# Patient Record
Sex: MALE | Race: WHITE | URBAN - METROPOLITAN AREA
[De-identification: names, ages, dates, MRNs, and addresses within clinical notes are randomized per-mention and may not be internally consistent; named-entity substitution may affect disease eponyms.]

---

## 2017-01-01 ENCOUNTER — NURSING HOME VISIT (OUTPATIENT)
Dept: GERIATRICS | Facility: CLINIC | Age: 77
End: 2017-01-01
Payer: MEDICAID

## 2017-01-01 ENCOUNTER — MEDICAL CORRESPONDENCE (OUTPATIENT)
Dept: HEALTH INFORMATION MANAGEMENT | Facility: CLINIC | Age: 77
End: 2017-01-01

## 2017-01-01 VITALS
TEMPERATURE: 97.3 F | OXYGEN SATURATION: 99 % | HEART RATE: 85 BPM | RESPIRATION RATE: 20 BRPM | SYSTOLIC BLOOD PRESSURE: 115 MMHG | DIASTOLIC BLOOD PRESSURE: 65 MMHG | WEIGHT: 108.3 LBS

## 2017-01-01 VITALS
DIASTOLIC BLOOD PRESSURE: 77 MMHG | TEMPERATURE: 96.5 F | RESPIRATION RATE: 20 BRPM | SYSTOLIC BLOOD PRESSURE: 132 MMHG | OXYGEN SATURATION: 97 % | WEIGHT: 105 LBS | HEART RATE: 80 BPM

## 2017-01-01 DIAGNOSIS — F41.9 ANXIETY: ICD-10-CM

## 2017-01-01 DIAGNOSIS — J96.11 CHRONIC RESPIRATORY FAILURE WITH HYPOXIA (H): ICD-10-CM

## 2017-01-01 DIAGNOSIS — J44.9 CHRONIC OBSTRUCTIVE PULMONARY DISEASE, UNSPECIFIED COPD TYPE (H): Primary | ICD-10-CM

## 2017-01-01 DIAGNOSIS — I10 BENIGN ESSENTIAL HYPERTENSION: ICD-10-CM

## 2017-01-01 PROCEDURE — 99308 SBSQ NF CARE LOW MDM 20: CPT | Performed by: NURSE PRACTITIONER

## 2017-01-01 PROCEDURE — 99309 SBSQ NF CARE MODERATE MDM 30: CPT | Performed by: INTERNAL MEDICINE

## 2017-05-20 ENCOUNTER — TRANSFERRED RECORDS (OUTPATIENT)
Dept: HEALTH INFORMATION MANAGEMENT | Facility: CLINIC | Age: 77
End: 2017-05-20

## 2017-06-07 ENCOUNTER — NURSING HOME VISIT (OUTPATIENT)
Dept: GERIATRICS | Facility: CLINIC | Age: 77
End: 2017-06-07
Payer: MEDICARE

## 2017-06-07 VITALS
WEIGHT: 116 LBS | HEART RATE: 74 BPM | DIASTOLIC BLOOD PRESSURE: 92 MMHG | TEMPERATURE: 97.7 F | RESPIRATION RATE: 20 BRPM | SYSTOLIC BLOOD PRESSURE: 161 MMHG | OXYGEN SATURATION: 95 %

## 2017-06-07 DIAGNOSIS — F32.A DEPRESSION, UNSPECIFIED DEPRESSION TYPE: ICD-10-CM

## 2017-06-07 DIAGNOSIS — I10 ESSENTIAL HYPERTENSION: ICD-10-CM

## 2017-06-07 DIAGNOSIS — J44.1 COPD EXACERBATION (H): Primary | ICD-10-CM

## 2017-06-07 DIAGNOSIS — R53.81 PHYSICAL DECONDITIONING: ICD-10-CM

## 2017-06-07 DIAGNOSIS — F41.9 ANXIETY: ICD-10-CM

## 2017-06-07 PROCEDURE — 99207 ZZC CDG-CORRECTLY CODED, REVIEWED AND AGREE: CPT | Performed by: NURSE PRACTITIONER

## 2017-06-07 PROCEDURE — 99309 SBSQ NF CARE MODERATE MDM 30: CPT | Performed by: NURSE PRACTITIONER

## 2017-06-07 RX ORDER — TIOTROPIUM BROMIDE 18 UG/1
18 CAPSULE ORAL; RESPIRATORY (INHALATION) DAILY
COMMUNITY

## 2017-06-07 RX ORDER — ALBUTEROL SULFATE 0.83 MG/ML
1 SOLUTION RESPIRATORY (INHALATION) PRN
COMMUNITY
End: 2018-01-01

## 2017-06-07 RX ORDER — BUDESONIDE AND FORMOTEROL FUMARATE DIHYDRATE 160; 4.5 UG/1; UG/1
2 AEROSOL RESPIRATORY (INHALATION) 2 TIMES DAILY
COMMUNITY
End: 2018-01-01

## 2017-06-07 RX ORDER — ZINC OXIDE 216 MG/ML
LOTION TOPICAL DAILY
COMMUNITY
End: 2018-01-01

## 2017-06-07 RX ORDER — ALBUTEROL SULFATE 90 UG/1
2 AEROSOL, METERED RESPIRATORY (INHALATION) 4 TIMES DAILY PRN
COMMUNITY

## 2017-06-07 RX ORDER — CETIRIZINE HYDROCHLORIDE 10 MG/1
10 TABLET ORAL DAILY
COMMUNITY
End: 2018-01-01

## 2017-06-07 NOTE — PROGRESS NOTES
[facility]  PRIMARY CARE PROVIDER AND CLINIC:  Doctor, None   Chief Complaint   Patient presents with     Hospital F/U       HPI:    Brando Tatum is a 76 year old  (1940),admitted to the [facility] from [facility].  Hospital stay 5/20/17 through 5/24/17.  Admitted to this facility for  rehab, medical management and nursing care.    Hospital Course Per [facility] Discharge Summary 5/24/17:              Current issues are:      COPD exacerbation (H)  See above. Patient has been using oxygen just as needed. He has episodes of SOB but he and nursing feel that this is more anxiety related. He is generally 95% on room air. No cough, wheezing.      Essential hypertension  BP readings range:  161/92  133/62  138/82  162/89  149/82  138/82  145/83    Physical deconditioning  Working with therapy.     Anxiety  Depression, unspecified depression type  Current regimen Lorazepam 0.25mg po BID, Lexapro 10mg po qday, and prn Seroquel. He has had a few panic attacks since arriving here. He has used the seroquel. He says this is new for him.      CODE STATUS/ADVANCE DIRECTIVES DISCUSSION:   DNR / DNI  Patient's living condition: lives alone    ALLERGIES:Review of patient's allergies indicates not on file.  PAST MEDICAL HISTORY:  has no past medical history on file.  PAST SURGICAL HISTORY:  has no past surgical history on file.  FAMILY HISTORY: family history is not on file.  SOCIAL HISTORY:      Post Discharge Medication Reconciliation Status: discharge medications reconciled, continue medications without change.  Current Outpatient Prescriptions   Medication Sig Dispense Refill     albuterol (2.5 MG/3ML) 0.083% neb solution Take 1 vial by nebulization every 2 hours as needed for shortness of breath / dyspnea or wheezing       albuterol (PROAIR HFA/PROVENTIL HFA/VENTOLIN HFA) 108 (90 BASE) MCG/ACT Inhaler Inhale 2 puffs into the lungs as needed for shortness of breath /  dyspnea or wheezing       LISINOPRIL PO Take 10 mg by mouth daily       cetirizine (ZYRTEC) 10 MG tablet Take 10 mg by mouth daily       DOCUSATE SODIUM PO Take 100 mg by mouth 2 times daily as needed for constipation       TRAMADOL HCL PO Take 50 mg by mouth every 6 hours as needed for moderate to severe pain       NUTRITIONAL SUPPLEMENT LIQD Take by mouth daily 240cc       LORazepam (ATIVAN PO) Take 0.25 mg by mouth 2 times daily       tiotropium (SPIRIVA) 18 MCG capsule Inhale 18 mcg into the lungs daily       QUETIAPINE FUMARATE PO Take 12.5 mg by mouth daily as needed       Escitalopram Oxalate (LEXAPRO PO) Take 10 mg by mouth daily       budesonide-formoterol (SYMBICORT) 160-4.5 MCG/ACT Inhaler Inhale 2 puffs into the lungs 2 times daily         ROS:  10 point ROS of systems including Constitutional, Eyes, Respiratory, Cardiovascular, Gastroenterology, Genitourinary, Integumentary, Muscularskeletal, Psychiatric were all negative except for pertinent positives noted in my HPI.    Exam:  BP (!) 161/92  Pulse 74  Temp 97.7  F (36.5  C)  Resp 20  Wt 116 lb (52.6 kg)  SpO2 95%  GENERAL APPEARANCE:  Alert, in no distress, oriented  ENT:  Mouth and posterior oropharynx normal, moist mucous membranes, normal hearing acuity  EYES:  EOM, conjunctivae, lids, pupils and irises normal  NECK:  No adenopathy,masses or thyromegaly  RESP:  respiratory effort and palpation of chest normal, lungs clear to auscultation , no respiratory distress  CV:  Palpation and auscultation of heart done , regular rate and rhythm, no murmur, rub, or gallop, no edema  ABDOMEN:  normal bowel sounds, soft, nontender, no hepatosplenomegaly or other masses  SKIN:  Inspection of skin and subcutaneous tissue baseline, Palpation of skin and subcutaneous tissue baseline  PSYCH:  oriented X 3, normal insight, judgement and memory, affect and mood normal    Lab/Diagnostic data: None available at this time.       ASSESSMENT/PLAN:  (J44.1) COPD  exacerbation (H)  (primary encounter diagnosis)  Comment: Improving  Plan: Continue current POC with no changes at this time. Monitor for respiratory distress. Monitor SaO2    (I10) Essential hypertension  Comment: Fair control. To avoid risk of hypotension, falls, dizziness and tissue hypoperfusion, recommend  BP goal is < 150/90mmHg.  Plan: Continue current POC with no changes at this time and adjustments as needed.    (R53.81) Physical deconditioning  Comment: Due to acute illness  Plan: PT/OT eval and treat, discharge planning per their recommendations.    (F41.9) Anxiety  (F32.9) Depression, unspecified depression type  Comment: Chronic with some acute worsening. Expect that these panic attacks will improve with time. They appear to mild and short-lived  Plan: Continue current POC with no changes at this time and adjustments as needed.        Information reviewed:  Medications, vital signs, orders, nursing notes, problem list, hospital information.     Electronically signed by:  JUVE Strange CNP   [facility]  Pager: [phone]

## 2017-06-21 ENCOUNTER — NURSING HOME VISIT (OUTPATIENT)
Dept: GERIATRICS | Facility: CLINIC | Age: 77
End: 2017-06-21
Payer: MEDICAID

## 2017-06-21 VITALS
WEIGHT: 112 LBS | TEMPERATURE: 98.2 F | SYSTOLIC BLOOD PRESSURE: 127 MMHG | HEART RATE: 89 BPM | RESPIRATION RATE: 18 BRPM | OXYGEN SATURATION: 100 % | DIASTOLIC BLOOD PRESSURE: 79 MMHG

## 2017-06-21 DIAGNOSIS — I10 BENIGN ESSENTIAL HYPERTENSION: ICD-10-CM

## 2017-06-21 DIAGNOSIS — K59.01 SLOW TRANSIT CONSTIPATION: ICD-10-CM

## 2017-06-21 DIAGNOSIS — J96.11 CHRONIC RESPIRATORY FAILURE WITH HYPOXIA (H): ICD-10-CM

## 2017-06-21 DIAGNOSIS — F41.9 ANXIETY: ICD-10-CM

## 2017-06-21 DIAGNOSIS — R53.81 PHYSICAL DECONDITIONING: ICD-10-CM

## 2017-06-21 DIAGNOSIS — J44.9 CHRONIC OBSTRUCTIVE PULMONARY DISEASE, UNSPECIFIED COPD TYPE (H): Primary | ICD-10-CM

## 2017-06-21 PROCEDURE — 99207 ZZC CDG-CORRECTLY CODED, REVIEWED AND AGREE: CPT | Performed by: INTERNAL MEDICINE

## 2017-06-21 PROCEDURE — 99306 1ST NF CARE HIGH MDM 50: CPT | Performed by: INTERNAL MEDICINE

## 2017-06-21 NOTE — PROGRESS NOTES
[facility]  INITIAL VISIT NOTE  June 21, 2017    PRIMARY CARE PROVIDER AND CLINIC:  Doctor, None     Chief Complaint   Patient presents with     Hospital F/U       HPI:    Brando Tatum is a 76 year old  (1940) male who was seen at [facility] on June 21, 2017 for an initial visit. Medical history is notable for COPD with chronic hypoxic respiratory failure (2L), HTN and anxiety. He was hospitalized at the [facility] from 5/20/17 to 5/24/17 where he presented with increasing shortness of breath. He was treated with a prednisone burst. PTA lisinopril was increased. He was admitted to this facility for medical management and rehab.     Today, Mr. Tatum is seen in his room. Anxiety is apparent. Supplemental O2 at baseline 2L. Says dyspnea is at baseline and wants to know how to make it better. Discussed nature of COPD. No chest pain. Working with therapies.     CODE STATUS:   DNR / DNI    ALLERGIES:   No Known Allergies    PAST MEDICAL HISTORY:   COPD  Chronic Hypoxic Respiratory Failure  Anxiety    PAST SURGICAL HISTORY:   No past surgical history on file.    FAMILY HISTORY:   No family history on file.    SOCIAL HISTORY:   Lives alone    MEDICATIONS:  Current Outpatient Prescriptions   Medication Sig Dispense Refill     albuterol (2.5 MG/3ML) 0.083% neb solution Take 1 vial by nebulization every 2 hours as needed for shortness of breath / dyspnea or wheezing       albuterol (PROAIR HFA/PROVENTIL HFA/VENTOLIN HFA) 108 (90 BASE) MCG/ACT Inhaler Inhale 2 puffs into the lungs as needed for shortness of breath / dyspnea or wheezing       LISINOPRIL PO Take 10 mg by mouth daily       cetirizine (ZYRTEC) 10 MG tablet Take 10 mg by mouth daily       DOCUSATE SODIUM PO Take 100 mg by mouth 2 times daily as needed for constipation       TRAMADOL HCL PO Take 50 mg by mouth every 6 hours as needed for moderate to severe pain       NUTRITIONAL SUPPLEMENT LIQD Take by mouth daily 240cc        LORazepam (ATIVAN PO) Take 0.25 mg by mouth 2 times daily       tiotropium (SPIRIVA) 18 MCG capsule Inhale 18 mcg into the lungs daily       QUETIAPINE FUMARATE PO Take 12.5 mg by mouth daily as needed       Escitalopram Oxalate (LEXAPRO PO) Take 10 mg by mouth daily       budesonide-formoterol (SYMBICORT) 160-4.5 MCG/ACT Inhaler Inhale 2 puffs into the lungs 2 times daily         Post Discharge Medication Reconciliation Status: medication reconcilation previously completed during another office visit.    ROS:  10 point ROS neg other than the symptoms noted above in the HPI.    PHYSICAL EXAM:  /79  Pulse 89  Temp 98.2  F (36.8  C)  Resp 18  Wt 112 lb (50.8 kg)  SpO2 100%  Gen: sitting on edge of bed, alert, cooperative and in no acute distress  HEENT: normocephalic; nasal cannula in place  Card: RRR, S1, S2, no murmurs  Resp: lungs clear to auscultation bilaterally, no wheezes and overall moving good air  GI: abdomen soft, not-tender  MSK: normal muscle tone, no LE edema  Neuro: CX II-XII grossly in tact; ROM in all four extremities grossly in tact  Psych: alert and oriented x3; anxious affect    LABORATORY/IMAGING DATA:  Reviewed as per Epic    ASSESSMENT/PLAN:    COPD  Chronic Hypoxic Respiratory Failure  No signs of exacerbation. Moving good air on exam, no wheezing.   -- continues on supplemental O2  -- continues on budesonide-formoterol 160-4.5 BID, tiotropium 18 mcg daily and albuterol PRN    Anxiety  Evident today. Reports panic attacks at home.   -- continues on escitalopram 10 mg daily, lorazepam 0.25 mg BID and quetiapine 12.5 mg daily PRN    HTN  Lisinopril increased during hospitalization. SBPs controlled.   -- continues on lisinopril 10 mg daily  -- follow BPs and adjust medication as needed    Slow Transit Constipation  -- continues on docusate 100 mg BID PRN  -- adjust bowel regimen as needed    Physical Deconditioning  In setting of hospitalization and underlying medical conditions  --  ongoing PT/OT      Electronically signed by:  Evelyn Lopez MD

## 2017-06-24 PROBLEM — R53.81 PHYSICAL DECONDITIONING: Status: ACTIVE | Noted: 2017-06-24

## 2017-06-24 PROBLEM — K59.01 SLOW TRANSIT CONSTIPATION: Status: ACTIVE | Noted: 2017-06-24

## 2017-06-24 PROBLEM — J96.11 CHRONIC RESPIRATORY FAILURE WITH HYPOXIA (H): Status: ACTIVE | Noted: 2017-06-24

## 2017-06-24 PROBLEM — J44.9 CHRONIC OBSTRUCTIVE PULMONARY DISEASE, UNSPECIFIED COPD TYPE (H): Status: ACTIVE | Noted: 2017-06-24

## 2017-06-24 PROBLEM — I10 BENIGN ESSENTIAL HYPERTENSION: Status: ACTIVE | Noted: 2017-06-24

## 2017-06-24 PROBLEM — F41.9 ANXIETY: Status: ACTIVE | Noted: 2017-06-24

## 2017-06-28 ENCOUNTER — NURSING HOME VISIT (OUTPATIENT)
Dept: GERIATRICS | Facility: CLINIC | Age: 77
End: 2017-06-28
Payer: MEDICAID

## 2017-06-28 VITALS
DIASTOLIC BLOOD PRESSURE: 67 MMHG | HEART RATE: 87 BPM | RESPIRATION RATE: 18 BRPM | SYSTOLIC BLOOD PRESSURE: 109 MMHG | OXYGEN SATURATION: 94 % | TEMPERATURE: 96.4 F

## 2017-06-28 DIAGNOSIS — J44.9 CHRONIC OBSTRUCTIVE PULMONARY DISEASE, UNSPECIFIED COPD TYPE (H): Primary | ICD-10-CM

## 2017-06-28 DIAGNOSIS — R53.81 PHYSICAL DECONDITIONING: ICD-10-CM

## 2017-06-28 DIAGNOSIS — F41.9 ANXIETY: ICD-10-CM

## 2017-06-28 DIAGNOSIS — I10 BENIGN ESSENTIAL HYPERTENSION: ICD-10-CM

## 2017-06-28 PROCEDURE — 99309 SBSQ NF CARE MODERATE MDM 30: CPT | Performed by: NURSE PRACTITIONER

## 2017-06-28 NOTE — PROGRESS NOTES
[facility]    Chief Complaint   Patient presents with     RECHECK       HPI:    Brando Tatum is a 76 year old  (1940), who is being seen today for an episodic care visit at [facility].  HPI information obtained from: facility chart records and patient report. He was hospitalized at the [facility] from 5/20/17 to 5/24/17 where he presented with increasing shortness of breath. He was treated with a prednisone burst. PTA lisinopril was increased. He was admitted to this facility for medical management and rehab.     Today's concern is:  Chronic obstructive pulmonary disease, unspecified COPD type (H)  Pt reports having dsypnea with activity, but is starting to get used to the work of breathing. Supplemental O2 at baseline 2. Pt denies chest pain/pressure, no coughing/wheezing.    Physical deconditioning  Pt currently not being seen with therapy. Pt reports concerns about losing weight and that the food does not have enough calories. Pt's weights have decreased from 115 lbs to 110 lbs during stay. Pt reports that placement is being worked on for an apartment, hopeful for the end of this month.     Anxiety  Current regimen lorazepam 0.25 mg PO BID, lexapro 10 mg PO daily, and prn seroquel. Pt reports still having almost daily small panic attacks, related to a sense of claustrophobia. Pt reports not sleeping well with noise and thinks that affects his anxiety as well.     Benign essential hypertension  Pt on lisinopril. Pt denies HA, chest pain/pressure  BP readings:  109/67  126/68  130/78  121/68  127/79      ALLERGIES: Review of patient's allergies indicates no known allergies.  Past Medical, Surgical, Family and Social History reviewed and updated in [facility].    Current Outpatient Prescriptions   Medication Sig Dispense Refill     albuterol (2.5 MG/3ML) 0.083% neb solution Take 1 vial by nebulization every 2 hours as needed for shortness of breath / dyspnea or wheezing       albuterol  (PROAIR HFA/PROVENTIL HFA/VENTOLIN HFA) 108 (90 BASE) MCG/ACT Inhaler Inhale 2 puffs into the lungs as needed for shortness of breath / dyspnea or wheezing       LISINOPRIL PO Take 10 mg by mouth daily       cetirizine (ZYRTEC) 10 MG tablet Take 10 mg by mouth daily       DOCUSATE SODIUM PO Take 100 mg by mouth 2 times daily as needed for constipation       TRAMADOL HCL PO Take 50 mg by mouth every 6 hours as needed for moderate to severe pain       NUTRITIONAL SUPPLEMENT LIQD Take by mouth daily 240cc       LORazepam (ATIVAN PO) Take 0.25 mg by mouth 2 times daily       tiotropium (SPIRIVA) 18 MCG capsule Inhale 18 mcg into the lungs daily       QUETIAPINE FUMARATE PO Take 12.5 mg by mouth daily as needed       Escitalopram Oxalate (LEXAPRO PO) Take 10 mg by mouth daily       budesonide-formoterol (SYMBICORT) 160-4.5 MCG/ACT Inhaler Inhale 2 puffs into the lungs 2 times daily       Medications reviewed:  Medications reconciled to facility chart and changes were made to reflect current medications as identified as above med list.     REVIEW OF SYSTEMS:  10 point ROS of systems including Constitutional, Eyes, Respiratory, Cardiovascular, Gastroenterology, Genitourinary, Integumentary, Muscularskeletal, Psychiatric were all negative except for pertinent positives noted in my HPI.    Physical Exam:  /67  Pulse 87  Temp 96.4  F (35.8  C)  Resp 18  SpO2 94%  GENERAL APPEARANCE:  Alert, in no distress, thin, cooperative  ENT:  Mouth and posterior oropharynx normal, moist mucous membranes  EYES:  EOM, conjunctivae, lids, pupils and irises normal  NECK:  No adenopathy,masses or thyromegaly  RESP:  respiratory effort and palpation of chest normal, lungs clear to auscultation , no respiratory distress  CV:  Palpation and auscultation of heart done , regular rate and rhythm, no murmur, rub, or gallop, no edema, +2 pedal pulses  ABDOMEN:  normal bowel sounds, soft, nontender, no hepatosplenomegaly or other  masses  PSYCH:  oriented X 3, normal insight, judgement and memory, affect and mood normal    Recent Labs:    None        Assessment/Plan:  (J44.9) Chronic obstructive pulmonary disease, unspecified COPD type (H)  (primary encounter diagnosis)  Comment: Chronic, improving.  Plan: Continue current POC with no changes at this time. Monitor for respiratory distress, SaO2.    (R53.81) Physical deconditioning  Comment: Due to acute illness, improved. Will chronically be limited by JENSEN.  Plan: Dietician to eval for weight loss     (F41.9) Anxiety  Comment: Chronic with some acute worsening. Not evident during visit. Expect that panic attacks will improve with time, more sleep and when in own apartment. Panic attacks are mild and short-lived, not needing extra medication to control.   Plan: Continue with current POC. Adjust evening lorazepam dose to 9 pm to assist with sleeping.     (I10) Benign essential hypertension  Comment: Chronic, Controlled. To avoid risk of hypotension, falls, dizziness and tissue hypoperfusion, recommend BP goal is < 150/90.  Plan: Continue with current POC with no changes at this time and adjust as needed.     Orders:  Dietary Referral r/t weight lose  Changes lorezapam evening dose to 9pm        Electronically signed by  Sharmila Mccord RN [employer] student NP    Patient seen and examined along with the nurse practitioner student. The HPI and ROS were obtained by the student and confirmed by myself. All objective, assessments, and plans were completed by myself  Aylin AN, CNP

## 2017-07-27 ENCOUNTER — TELEPHONE (OUTPATIENT)
Dept: GERIATRICS | Facility: CLINIC | Age: 77
End: 2017-07-27

## 2017-07-27 NOTE — TELEPHONE ENCOUNTER
TELEPHONE ENCOUNTER:      Brando Tatum is a 76 year old  (1940),Nurse called today to report: weaker today than yesterday, looking pale, working harder to breath.  128/80 P 104 R 22 O2 95% on 2L/NC    ASSESSMENT/PLAN  COPD EXACERBATION    HOLD SYMBICORT X 5 DAYS    DUONEB 1 VIAL QID X 5 DAYS    PREDNISONE 20 MG X 3 DAYS THEN 10 MG X 3 DAYS THEN DC    Olga Pierce, APRN CNP

## 2017-07-31 ENCOUNTER — DOCUMENTATION ONLY (OUTPATIENT)
Dept: OTHER | Facility: CLINIC | Age: 77
End: 2017-07-31

## 2017-07-31 PROBLEM — Z71.89 ACP (ADVANCE CARE PLANNING): Chronic | Status: ACTIVE | Noted: 2017-07-31

## 2017-08-10 NOTE — PROGRESS NOTES
[facility]  Nursing Home Regulatory Visit  August 10, 2017    Chief Complaint   Patient presents with     FPC Regulatory       HPI:    Brando Tatum is a 77 year old  (1940), who is being seen today for a federally mandated E/M visit at [facility]. Today's concerns are:    1) HTN -- SBPs 130s overall on lisinopril 10 mg daily   2) COPD / Chronic Hypoxic Respiratory Failure -- No signs of exacerbation. His anxiety compounds any difficulty breathing.  Managed with budesonide-formoterol 160-4.5 BID, tiotropium 18 mcg daily and albuterol PRN  3) Anxiety -- remains a significant issue, but stable with current regimen of escitalopram 10 mg daily, lorazepam 0.25 mg BID and quetiapine 12.5 mg daily PRN    ALLERGIES: Review of patient's allergies indicates no known allergies.    PAST MEDICAL HISTORY:   COPD  Chronic Hypoxic Respiratory Failure  Anxiety  HTN    PAST SURGICAL HISTORY:   No past surgical history on file.    FAMILY HISTORY:   No family history on file.    SOCIAL HISTORY:   Lives in a SNF    MEDICATIONS:  Current Outpatient Prescriptions   Medication Sig Dispense Refill     albuterol (2.5 MG/3ML) 0.083% neb solution Take 1 vial by nebulization every 2 hours as needed for shortness of breath / dyspnea or wheezing       albuterol (PROAIR HFA/PROVENTIL HFA/VENTOLIN HFA) 108 (90 BASE) MCG/ACT Inhaler Inhale 2 puffs into the lungs as needed for shortness of breath / dyspnea or wheezing       LISINOPRIL PO Take 10 mg by mouth daily       cetirizine (ZYRTEC) 10 MG tablet Take 10 mg by mouth daily       DOCUSATE SODIUM PO Take 100 mg by mouth 2 times daily as needed for constipation       TRAMADOL HCL PO Take 50 mg by mouth every 6 hours as needed for moderate to severe pain       NUTRITIONAL SUPPLEMENT LIQD Take by mouth daily 240cc       LORazepam (ATIVAN PO) Take 0.25 mg by mouth 2 times daily       tiotropium (SPIRIVA) 18 MCG capsule Inhale 18 mcg into the lungs daily        QUETIAPINE FUMARATE PO Take 12.5 mg by mouth daily as needed       Escitalopram Oxalate (LEXAPRO PO) Take 10 mg by mouth daily       budesonide-formoterol (SYMBICORT) 160-4.5 MCG/ACT Inhaler Inhale 2 puffs into the lungs 2 times daily         Medications reviewed:  Medications reconciled to facility chart and changes were made to reflect current medications as identified as above med list. Below are the changes that were made:   Medications stopped since last EPIC medication reconciliation:   There are no discontinued medications.  Medications started since last [facility] medication reconciliation:  No orders of the defined types were placed in this encounter.    Case Management:  I have reviewed the care plan and MDS and do agree with the plan.   Information reviewed:  Medications, vital signs, orders, and nursing notes.    ROS:  4 point ROS neg other than the symptoms noted above in the HPI.    PHYSICAL EXAM:  /77  Pulse 80  Temp 96.5  F (35.8  C)  Resp 20  Wt 105 lb (47.6 kg)  SpO2 97%  Gen: sitting on edge of bed, alert, cooperative and in no acute distress  HEENT: normocephalic; nasal cannula in place  Card: RRR, S1, S2, no murmurs  Resp: lungs diminished but clear to auscultation   GI: abdomen soft, not-tender  Ext: no LE edema  Neuro: CX II-XII grossly in tact; ROM in all four extremities grossly in tact  Psych: alert and oriented to self and general situation; normal affect today     Lab/Diagnostic data:  Reviewed as per Epic    ASSESSMENT/PLAN    1) HTN   SBPs 130s overall   -- continues on lisinopril 10 mg daily   -- follow BPs and adjust medication as needed    2) COPD / Chronic Hypoxic Respiratory Failure    No signs of exacerbation. His anxiety compounds any difficulty breathing.    -- continues on budesonide-formoterol 160-4.5 BID, tiotropium 18 mcg daily and albuterol PRN    3) Anxiety   Remains a significant issue, but stable with current regimen  -- continues on escitalopram 10 mg daily,  lorazepam 0.25 mg BID and quetiapine 12.5 mg daily PRN  -- supportive cares    Brando Tatum is stable. No concerns per nursing. No changes to plan of care today.    Electronically signed by:  Evelny Lopez MD

## 2017-08-16 NOTE — PROGRESS NOTES
Face to Face and Medical Necessity Statement for DME Provider visit    Demographic Information on Brando Tatum:  Gender: male  : 1940  [address]  [facility] [address]  [phone] (home) [phone] (work)    Medical Record: [id number]  Social Security Number: [id number]  Primary Care Provider: Doctor, None  Insurance: Pay[place] / [place] / Product Type: Medicaid /     HPI:   Brando Tatum is a 77 year old  (1940), who is being seen today for a face to face provider visit at [facility]; medical necessity statement for DME included. This patient requires the following:  DME ordered:  Oxygen: 2 L/min via nasal cannula neither continuous and needs portable tank due to mobility in home environment ; Clinical Documentation: (Obtain documented RA sat with in 2 days of discharge in acute setting or within 30 days of provider visit):  Method 2: During Ambulation/Exercises: Qualifing saturation level is < 88% or below during amulation: 94% sat on RA at rest on 17, 87% sat during ambulation/exercises without Oxygen, and 95% sat during amulation/exercises with Oxygen at 2 Lpm on 17 date      Nebulizer with kits:  for albuterol 2.5mg/3mL q2h prn     Medical Necessity Statement   Pt needing above DME with expected length of need of  99  months  due to medical necessity associated with following diagnosis:  Chronic obstructive pulmonary disease, unspecified COPD type (H)      Patient will require oxygen and nebulizer for use at home after discharge due to COPD. Without this equipment, patient is high risk for hospitalization.     [facility]   has no past medical history on file.  CURRENT MEDICATIONS  Current Outpatient Prescriptions   Medication Sig Dispense Refill     albuterol (2.5 MG/3ML) 0.083% neb solution Take 1 vial by nebulization every 2 hours as needed for shortness of breath / dyspnea or wheezing       albuterol (PROAIR HFA/PROVENTIL HFA/VENTOLIN  HFA) 108 (90 BASE) MCG/ACT Inhaler Inhale 2 puffs into the lungs as needed for shortness of breath / dyspnea or wheezing       LISINOPRIL PO Take 10 mg by mouth daily       cetirizine (ZYRTEC) 10 MG tablet Take 10 mg by mouth daily       DOCUSATE SODIUM PO Take 100 mg by mouth 2 times daily as needed for constipation       TRAMADOL HCL PO Take 50 mg by mouth every 6 hours as needed for moderate to severe pain       NUTRITIONAL SUPPLEMENT LIQD Take by mouth daily 240cc       LORazepam (ATIVAN PO) Take 0.25 mg by mouth 2 times daily       tiotropium (SPIRIVA) 18 MCG capsule Inhale 18 mcg into the lungs daily       QUETIAPINE FUMARATE PO Take 12.5 mg by mouth 2 times daily as needed        Escitalopram Oxalate (LEXAPRO PO) Take 10 mg by mouth daily       budesonide-formoterol (SYMBICORT) 160-4.5 MCG/ACT Inhaler Inhale 2 puffs into the lungs 2 times daily       ROS:4 point ROS including Respiratory, CV, GI and , other than that noted in the HPI,  is negative     EXAM  Vitals: /65  Pulse 85  Temp 97.3  F (36.3  C)  Resp 20  Wt 108 lb 4.8 oz (49.1 kg)  SpO2 99%;BMI= There is no height or weight on file to calculate BMI.  Respiratory: negative, Percussion normal. Good diaphragmatic excursion. Lungs clear     ASSESSMENT/PLAN:  1. Chronic obstructive pulmonary disease, unspecified COPD type (H)        Orders:  Oxygen 2 liters continuous  Neb machine with kits  Facility staff/TC to contact [employer] to get their order form for provider to fill out    ELECTRONICALLY SIGNED BY PROVIDER:  JUVE Strange CNP   NPI: [id number]  [facility]  [address] [place] [address], [address]  [place] [address]

## 2018-01-01 ENCOUNTER — ASSISTED LIVING VISIT (OUTPATIENT)
Dept: GERIATRICS | Facility: CLINIC | Age: 78
End: 2018-01-01
Payer: MEDICARE

## 2018-01-01 ENCOUNTER — PATIENT OUTREACH (OUTPATIENT)
Dept: GERIATRIC MEDICINE | Facility: CLINIC | Age: 78
End: 2018-01-01

## 2018-01-01 ENCOUNTER — TELEPHONE (OUTPATIENT)
Dept: GERIATRICS | Facility: CLINIC | Age: 78
End: 2018-01-01

## 2018-01-01 VITALS
SYSTOLIC BLOOD PRESSURE: 128 MMHG | DIASTOLIC BLOOD PRESSURE: 92 MMHG | RESPIRATION RATE: 22 BRPM | HEART RATE: 105 BPM | WEIGHT: 95 LBS | BODY MASS INDEX: 13.25 KG/M2 | TEMPERATURE: 97.1 F

## 2018-01-01 VITALS
TEMPERATURE: 97.6 F | SYSTOLIC BLOOD PRESSURE: 115 MMHG | BODY MASS INDEX: 13.3 KG/M2 | DIASTOLIC BLOOD PRESSURE: 65 MMHG | RESPIRATION RATE: 16 BRPM | HEART RATE: 84 BPM | WEIGHT: 95 LBS | HEIGHT: 71 IN

## 2018-01-01 DIAGNOSIS — I10 BENIGN ESSENTIAL HYPERTENSION: ICD-10-CM

## 2018-01-01 DIAGNOSIS — Z71.89 ACP (ADVANCE CARE PLANNING): Chronic | ICD-10-CM

## 2018-01-01 DIAGNOSIS — E46 PROTEIN-CALORIE MALNUTRITION, UNSPECIFIED SEVERITY (H): ICD-10-CM

## 2018-01-01 DIAGNOSIS — K59.01 SLOW TRANSIT CONSTIPATION: ICD-10-CM

## 2018-01-01 DIAGNOSIS — N39.46 MIXED INCONTINENCE URGE AND STRESS (MALE)(FEMALE): ICD-10-CM

## 2018-01-01 DIAGNOSIS — G89.29 CHRONIC BILATERAL LOW BACK PAIN, WITH SCIATICA PRESENCE UNSPECIFIED: ICD-10-CM

## 2018-01-01 DIAGNOSIS — J44.9 CHRONIC OBSTRUCTIVE PULMONARY DISEASE, UNSPECIFIED COPD TYPE (H): Primary | ICD-10-CM

## 2018-01-01 DIAGNOSIS — H04.123 DRY EYES: ICD-10-CM

## 2018-01-01 DIAGNOSIS — J96.11 CHRONIC RESPIRATORY FAILURE WITH HYPOXIA (H): ICD-10-CM

## 2018-01-01 DIAGNOSIS — J44.1 OBSTRUCTIVE CHRONIC BRONCHITIS WITH EXACERBATION (H): ICD-10-CM

## 2018-01-01 DIAGNOSIS — M54.5 CHRONIC BILATERAL LOW BACK PAIN, WITH SCIATICA PRESENCE UNSPECIFIED: ICD-10-CM

## 2018-01-01 DIAGNOSIS — E43 SEVERE PROTEIN-CALORIE MALNUTRITION (H): ICD-10-CM

## 2018-01-01 DIAGNOSIS — Z51.5 HOSPICE CARE PATIENT: ICD-10-CM

## 2018-01-01 DIAGNOSIS — F41.9 ANXIETY: ICD-10-CM

## 2018-01-01 RX ORDER — SENNOSIDES 8.6 MG
1 TABLET ORAL 2 TIMES DAILY
COMMUNITY

## 2018-01-01 RX ORDER — METHADONE HYDROCHLORIDE 10 MG/ML
CONCENTRATE ORAL AT BEDTIME
COMMUNITY

## 2018-01-01 RX ORDER — POLYETHYLENE GLYCOL 3350 17 G/17G
0.5 POWDER, FOR SOLUTION ORAL EVERY OTHER DAY
COMMUNITY

## 2018-01-01 RX ORDER — BISACODYL 10 MG
10 SUPPOSITORY, RECTAL RECTAL DAILY PRN
COMMUNITY

## 2018-01-01 RX ORDER — ACETAMINOPHEN 650 MG/1
650 SUPPOSITORY RECTAL EVERY 4 HOURS PRN
COMMUNITY

## 2018-01-01 RX ORDER — MORPHINE SULFATE 30 MG/1
2.5 TABLET ORAL
COMMUNITY

## 2018-01-01 RX ORDER — HALOPERIDOL 0.5 MG/1
0.5 TABLET ORAL EVERY 6 HOURS PRN
COMMUNITY

## 2018-01-01 RX ORDER — POLYETHYLENE GLYCOL 3350 17 G/17G
17 POWDER, FOR SOLUTION ORAL DAILY PRN
COMMUNITY
End: 2018-01-01 | Stop reason: DRUGHIGH

## 2018-01-01 RX ORDER — ATROPINE SULFATE 10 MG/ML
2 SOLUTION OPHTHALMIC
COMMUNITY

## 2018-01-01 ASSESSMENT — ACTIVITIES OF DAILY LIVING (ADL)
DEPENDENT_IADLS:: CLEANING;COOKING;LAUNDRY;SHOPPING;MEAL PREPARATION;MEDICATION MANAGEMENT;MONEY MANAGEMENT;TRANSPORTATION

## 2018-04-24 PROBLEM — N39.46 MIXED INCONTINENCE URGE AND STRESS (MALE)(FEMALE): Status: ACTIVE | Noted: 2018-01-01

## 2018-04-24 NOTE — PROGRESS NOTES
[facility]  PRIMARY CARE PROVIDER AND CLINIC:  Olga Pierce [address] / [facility] [address]  Chief Complaint   Patient presents with     [facility] Care       HPI:    Brando Tatum is a 77 year old  (1940),admitted to the [facility] on 8/23/17 from [facility].  Admitted to this facility for  nursing care and safe housing.  HPI information obtained from: facility staff and patient report.        Current issues are:      Chronic obstructive pulmonary disease, unspecified COPD type (H)  Chronic respiratory failure with hypoxia (H)  He is oxygen dependent, uses nebulizer and inhalers daily.  He gets very SOB walking 10 feet and O2 sat decreases to low 80s on RA.  Took about 10 min to recover with O2 @ 3 L/NC and Sat increased to 89 %  He has had a Hospice referral that when they called he declined.  He is more open to the service at this time.    Anxiety  Related to breathlessness 2/2 above.  States the Lorazepam does work.  We did talk at length about hospice.  We discussed how Morphine could help with the anxiety he experiences, but for now will stay with the Lorazepam.    Protein-calorie malnutrition, unspecified severity (H)  He continues to loose weight. His brother brought him protein drinks.  States he doesn't have any surplus money to cover co-pays for much of anything.  States he has an appetite but gets full fast.    Benign essential hypertension  Controlled on medication    Slow transit constipation  His usual pattern is about every 2-3 days.  He has Colace and Miralax as needed.  Problem is he waits until he hasn't gone in 4-5 days then the Miralax will give him diarrhea.    ACP (advance care planning)  Spent 22 min discussing completing the POLST and Hospice as stated above.  - HOSPICE REFERRAL    Mixed incontinence urge and stress (male)  Due to his COPD he coughs a lot and will have urinary leakage.  He is wondering about incontinent  briefs would need about 2 daily    Chronic bilateral back pain, with sciatica unspecified  Takes Tramadol which has improved his quality of life, without it, he can't move very well due to the pain.      CODE STATUS/ADVANCE DIRECTIVES DISCUSSION:   DNR / DNI  Patient's living condition: lives in an assisted living facility    ALLERGIES:Review of patient's allergies indicates no known allergies.  PAST MEDICAL HISTORY:  has a past medical history of ACP (advance care planning) (7/31/2017); Anxiety (6/24/2017); Arthritis; Benign essential hypertension (6/24/2017); Chronic obstructive pulmonary disease, unspecified COPD type (H) (6/24/2017); Chronic respiratory failure with hypoxia (H) (6/24/2017); Physical deconditioning (6/24/2017); and Slow transit constipation (6/24/2017).  PAST SURGICAL HISTORY:  has no past surgical history on file.  FAMILY HISTORY: family history is not on file.  SOCIAL HISTORY:  reports that he has quit smoking. His smoking use included Cigarettes. He has never used smokeless tobacco. He reports that he does not drink alcohol.    Post Discharge Medication Reconciliation Status: patient was not discharged from an inpatient facility.  Current Outpatient Prescriptions   Medication Sig Dispense Refill     albuterol (2.5 MG/3ML) 0.083% neb solution Take 1 vial by nebulization as needed for shortness of breath / dyspnea or wheezing Take as directed       albuterol (PROAIR HFA/PROVENTIL HFA/VENTOLIN HFA) 108 (90 BASE) MCG/ACT Inhaler Inhale 2 puffs into the lungs 4 times daily as needed for shortness of breath / dyspnea or wheezing        budesonide-formoterol (SYMBICORT) 160-4.5 MCG/ACT Inhaler Inhale 2 puffs into the lungs 2 times daily       DOCUSATE SODIUM PO Take 100 mg by mouth as needed for constipation        LISINOPRIL PO Take 10 mg by mouth daily       LORazepam (ATIVAN PO) Take 0.5 mg by mouth 2 times daily        polyethylene glycol (MIRALAX/GLYCOLAX) powder Take 0.5 capfuls by mouth every  "other day       tiotropium (SPIRIVA) 18 MCG capsule Inhale 18 mcg into the lungs daily       TRAMADOL HCL PO Take 100 mg by mouth every morning Also give 50mg po BID         ROS:  10 point ROS of systems including Constitutional, Eyes, Respiratory, Cardiovascular, Gastroenterology, Genitourinary, Integumentary, Muscularskeletal, Psychiatric were all negative except for pertinent positives noted in my HPI.    Exam:  /65  Pulse 84  Temp 97.6  F (36.4  C)  Resp 16  Ht 5' 11\" (1.803 m)  Wt 95 lb (43.1 kg)  BMI 13.25 kg/m2  GENERAL APPEARANCE:  Alert, thin, in moderate  distress due to dyspnea  ENT:  Mouth and posterior oropharynx normal, moist mucous membranes, normal hearing acuity  EYES:  EOM, conjunctivae, lids, pupils and irises normal  NECK:  No adenopathy,masses or thyromegaly  RESP:  diminished breath sounds bilateral bases, respiratory distress, dyspneic, using accessory muscles  CV:  Palpation and auscultation of heart done , regular rate and rhythm, no murmur, rub, or gallop, no edema  ABDOMEN:  normal bowel sounds, soft, nontender, no hepatosplenomegaly or other masses  M/S:   Gait and station abnormal deconditioned, needs to rest after 10-15 feet.  w/c for long distance  Digits and nails abnormal hypertrophic, thick fungal toenails  SKIN:  Inspection of skin and subcutaneous tissue baseline, Palpation of skin and subcutaneous tissue baseline  NEURO:   Cranial nerves 2-12 are normal tested and grossly at patient's baseline, Examination of sensation by touch normal  PSYCH:  oriented X 3, normal insight, judgement and memory    Lab/Diagnostic data: None available at this time.       ASSESSMENT/PLAN:  (J44.9) Chronic obstructive pulmonary disease, unspecified COPD type (H)  (primary encounter diagnosis)  (J96.11) Chronic respiratory failure with hypoxia (H)  Comment: Oxygen dependent, deteriorating   Plan: HOSPICE REFERRAL, DNR/DNI        Continue nebulizer and inhalers.  Oxygen " continuous.    (F41.9) Anxiety  Comment: not at goal  Plan: HOSPICE REFERRAL        Continue Lorazepam scheduled twice daily.  Considering initiation of Morphine, will discuss and implement when Hospice is on board will have increased clinical monitoring at that time.    (E46) Protein-calorie malnutrition, unspecified severity (H)  Comment: BMI 13.25  Plan: Nutritional supplement BID    (I10) Benign essential hypertension  Comment: reasonably controlled   Plan: continue Lisinopril    (K59.01) Slow transit constipation  Comment: not at goal  Plan: will schedule Miralax 0.5 capful every other day.  Continue PRN Colace.    (Z71.89) ACP (advance care planning)  Comment: comfort focused care  Plan: HOSPICE REFERRAL        [facility] choice    (N39.46) Mixed incontinence urge and stress (male)  Comment: daily episodes  Plan: order male incontinent briefs sm/med    (M54.5,  G89.29) Chronic bilateral low back pain, with sciatica presence unspecified  Comment: reasonable control  Plan: continue Tramadol and Tylenol      Total time with a new patient visit in the assisted livin minutes including discussions about the POC and care coordination with the patient and caregiver. Greater than 50% of total time spent with counseling and coordinating care due to establishing care, advanced care planning, and supply needs    Electronically signed by:  JUVE Shipley CNP

## 2018-04-26 PROBLEM — E46 PROTEIN-CALORIE MALNUTRITION (H): Status: ACTIVE | Noted: 2018-01-01

## 2018-05-15 NOTE — PROGRESS NOTES
[facility] Care Coordination Contact  CM contacted client's hospice nurse to schedule a home visit at the same time. Scheduled for 05/16 at 11:00. CM attempted to reach client at the phone number listed in EPIC but reached the  at [facility]. A message was left with Bridgette to inform client that CM would be coming out to see him for assessment tomorrow along with the hospice nurse.   Jumana Chambers RN  [facility]   [phone]

## 2018-05-16 NOTE — PROGRESS NOTES
[facility] Care Coordination Contact    [facility] Initial Assessment     Home visit for Initial Health Risk Assessment with Brando ALESSIA Tatum completed on May 16, 2018    Type of residence:: Assisted living  Current living arrangement:: I live in assisted living          Current Care Plan  Member currently receiving the following home care services: Skilled Nursing, Home Health Aid   Member currently receiving the following community resources: Hospice, Home Care, DME, Housekeeping/Chore Agency, Lifeline      Medication Review  Medication reconciliation completed in Epic: Yes  Medication set-up & administration: RN sets up  weekly.  Assisting Living staff administers medications.  Medication understanding concerns (by member, family or CC): No  Medication adherence concerns (by member, family or CC): No    Mental/Behavioral Health   Depression Screening: See PHQ assessment flowsheet.   Mental health DX:: Yes   Mental health DX how managed:: Supplements  No current MH services-will place referral for Medication Client has a history of anxiety and when he can't breath he becomes more anxious. Hospice is involved to manage his anxiety and meds.     Falls Assessment:   Fallen 2 or more times in the past year?: No   Any fall with injury in the past year?: No    ADL/IADL Dependencies:   Dependent ADLs:: Wheelchair-independent, Ambulation-no assistive device  Dependent IADLs:: Cleaning, Cooking, Laundry, Shopping, Meal Preparation, Medication Management, Money Management, Transportation    [facility] sponsored benefits: Shared information re: Silver Sneakers/gym memberships, ASA, Calcium +D.    PCA Assessment completed at visit: No     Elderly Waiver Eligibility: Yes-will continue on EW    Care Plan & Recommendations: Continue the services provided by the AL. Hospice home care to help support the AL staff.     See LTCC for detailed assessment information.    Follow-Up Plan: Member informed of future  contact, plan to f/u with member with a 6 month telephone assessment.  Contact information shared with member and family, encouraged member to call with any questions or concerns at any time.    [facility] care continuum providers: Please refer to Health Care Home on the Epic Problem List to view this patient's [facility] Care Plan Summary.  Jumana Chambers RN  [facility]   [phone]

## 2018-05-21 NOTE — TELEPHONE ENCOUNTER
"[facility] BRIDGE ENCOUNTER    Chief Complaint   Patient presents with     Fall     Hypotension       Brando Tatum is a 77 year old  (1940),Nurse sent message today to report: Patient fall over the weekend; BPs running low 2/2 decreased PO intake - patient is on Hospice    ASSESSMENT/PLAN  \"Brando reported that he fell in his apartment on 5/19/18. He has an abrasion on his right forearm. It was cleaned and covered with a bandage at the time. I did the fall follow up today. No drainage and did not even really need a band aid. Spoke with him about being careful with his O2 tubing as it is a fall hazard. He did not hit his head and LOC was baseline. He did have a BP of 93/78. He is barely eating or drinking anything. Could we d/c his lisinopril? It's only 5 mg\"    Okay to D/c lisinopril    BP BID x5 days    Update provider if >140/90    Ivy Man, JUVE CNP    "

## 2018-06-12 NOTE — PROGRESS NOTES
[facility]    Chief Complaint   Patient presents with     ANIKAKYLIE       [facility] Medical Record Number:  [id number]    HPI:    Brando Tatum is a 77 year old  (1940), who is being seen today for an episodic care visit at [facility].      HPI information obtained from: facility chart records, facility staff, patient report and [facility] chart review.    Today's concern is:  Dry eyes  Generally drying out due to decreased food and fluid intake.  Say his eyes are sore  - carboxymethylcellulose (CELLUVISC/REFRESH LIQUIGEL) 1 % ophthalmic solution; Place 1 drop into both eyes 2 times daily    Chronic obstructive pulmonary disease, unspecified COPD type (H)  End stage - Hospice diagnosis.  He is more and more dyspneic and the morphine is helping him to be more comfortable. He has visibly lost more muscle mass.  He is barely strong enough to sit up for more than a minute.  No appetite.     Chronic respiratory failure with hypoxia (H)  Secondary to above    Benign essential hypertension  Diastolic Blood pressure is elevated, however due to his small arms, the measurement may be skewed .  Denies headache, however, he does not track well.        Mixed incontinence urge and stress (male)(female)  Wearing incontinent brief at all times    Hospice care patient        ALLERGIES: Review of patient's allergies indicates no known allergies.  Past Medical, Surgical, Family and Social History reviewed and updated in [facility].    Current Outpatient Prescriptions   Medication Sig Dispense Refill     acetaminophen (TYLENOL) 650 MG Suppository Place 650 mg rectally every 4 hours as needed for fever or mild pain       albuterol (PROAIR HFA/PROVENTIL HFA/VENTOLIN HFA) 108 (90 BASE) MCG/ACT Inhaler Inhale 2 puffs into the lungs 4 times daily as needed for shortness of breath / dyspnea or wheezing        atropine 1 % (PF) ophthalmic solution Place 2 drops inside cheek every 2 hours as needed for  secretions       bisacodyl (DULCOLAX) 10 MG Suppository Place 10 mg rectally daily as needed for constipation       budesonide-formoterol (SYMBICORT) 160-4.5 MCG/ACT Inhaler Inhale 2 puffs into the lungs 2 times daily       DOCUSATE SODIUM PO Take 100 mg by mouth 2 times daily as needed for constipation        haloperidol (HALDOL) 0.5 MG tablet Take 0.5 mg by mouth every 6 hours as needed for agitation (agitation or nausea)       LORazepam (ATIVAN PO) Take 0.5 mg by mouth 3 times daily May also have every 4 hours as needed PRN for anxiety, SOB, pain per hospice orders       methadone (DOLOPHINE-INTENSOL) 10 MG/ML (HIGH CONC) solution Take by mouth At Bedtime Give 0.1mL       MIRTAZAPINE PO Take 7.5 mg by mouth At Bedtime       morphine 2.5 MG solu-tab Take 2.5 mg by mouth every 2 hours as needed for shortness of breath / dyspnea or moderate to severe pain       polyethylene glycol (MIRALAX/GLYCOLAX) powder Take 0.5 capfuls by mouth every other day       sennosides (SENOKOT) 8.6 MG tablet Take 1 tablet by mouth 2 times daily       tiotropium (SPIRIVA) 18 MCG capsule Inhale 18 mcg into the lungs daily       TRAMADOL HCL PO Take 100 mg by mouth 3 times daily        [DISCONTINUED] albuterol (2.5 MG/3ML) 0.083% neb solution Take 1 vial by nebulization as needed for shortness of breath / dyspnea or wheezing Take as directed       Medications reviewed:  Medications reconciled to facility chart and changes were made to reflect current medications as identified as above med list. Below are the changes that were made:   Medications stopped since last EPIC medication reconciliation:   Medications Discontinued During This Encounter   Medication Reason     albuterol (2.5 MG/3ML) 0.083% neb solution Medication Reconciliation Clean Up       Medications started since last [facility] medication reconciliation:  Orders Placed This Encounter   Medications     methadone (DOLOPHINE-INTENSOL) 10 MG/ML (HIGH CONC) solution     Sig: Take by  "mouth At Bedtime Give 0.1mL     morphine 2.5 MG solu-tab     Sig: Take 2.5 mg by mouth every 2 hours as needed for shortness of breath / dyspnea or moderate to severe pain         REVIEW OF SYSTEMS:  Limited secondary to frailty impairment but today pt reports \"he has about 15 more days to live\"  Otherwise not in pain.    Physical Exam:  BP (!) 128/92  Pulse 105  Temp 97.1  F (36.2  C)  Resp 22  Wt 95 lb (43.1 kg)  BMI 13.25 kg/m2  ENERAL APPEARANCE:  Alert, thin, in moderate  distress due to dyspnea  ENT:  Mouth and posterior oropharynx dry mucous membranes, normal hearing acuity  EYES:  EOM, conjunctivae mildly injected, lid lashes crusty, pupils and irises normal  NECK:  No adenopathy,masses or thyromegaly  RESP:  diminished breath sounds bilateral bases, respiratory distress, dyspneic, using accessory muscles  CV:  Palpation and auscultation of heart done , regular rate and rhythm, no murmur, rub, or gallop, no edema  ABDOMEN:  Sunken, normal bowel sounds, soft, nontender, no hepatosplenomegaly or other masses  M/S:   Gait and station abnormal deconditioned,unable to sit without assist and hold upright  Digits and nails abnormal hypertrophic, thick fungal toenails  SKIN:  Inspection of skin and subcutaneous tissue baseline, Palpation of skin and subcutaneous tissue baseline  NEURO:   Cranial nerves 2-12 are normal tested and grossly at patient's baseline, Examination of sensation by touch normal    Recent Labs:  Reviewed facility chart    Assessment/Plan:  (J44.9) Chronic obstructive pulmonary disease, unspecified COPD type (H)  (primary encounter diagnosis)  Comment: end stage Hospice dx  Plan: added Methadone for comfort, increased frequency for Morphine to be available for dyspnea and pain    (H04.123) Dry eyes  Comment: acute  Plan: carboxymethylcellulose (CELLUVISC/REFRESH         LIQUIGEL) 1 % ophthalmic solution        Lid hygiene at hs    (J96.11) Chronic respiratory failure with hypoxia " (H)  Comment: secondary to #1  Plan: chronic O2 @ 3L/NC, nebulizer treatments as ordered    (I10) Benign essential hypertension  Comment: uncontrolled  Plan: with rapid functional decline, no new orders     (N39.46) Mixed incontinence urge and stress (male)  Comment: increased incontinence due to increased weakness and functional decline  Plan: continue to assist with incontinent briefs 4-5 times daily    (Z51.5) Hospice care patient  Comment: [facility]   Plan: continue      Electronically signed by  JUVE Shipley CNP

## 2018-06-15 NOTE — LETTER
Renate 15, 2018    [facility]  [address]  [facility] [address]     Dear Brando,    At [facility], we re dedicated to improving the health and well-being of our members.  Enclosed you will find the Comprehensive Care Plan that was developed with you on 5/16/2018. Please review the Care Plan carefully.    As a reminder, some of the things we discussed at your visit include:    Ways to improve or maintain your physical health such as walking 20 minutes a day.     Ways to reduce the risk of falls.     Health care needs you may have.     Don t forget to contact your care coordinator if you:    Have been hospitalized or plan to be hospitalized.     Have experienced a fall.      Have experienced a change in physical health, which may include bladder control and pain issues.      Are experiencing emotional problems.     If you do not agree with your Care Plan, have questions about it, or have experienced a change in your needs, please contact me, your care coordinator, at [phone]. If you are hearing impaired, please call the [place] [employer] at 033 or [phone] (zudfan-[id number] relay service).    Sincerely,      Jumana Chambers RN, MA  [facility]     MSC+ [phone]                                        (12/16)

## 2018-06-15 NOTE — PROGRESS NOTES
[facility] Care Coordination Contact  Received after visit chart from care coordinator.  Completed following tasks: Mailed copy of care plan to client and Updated services in access   Provider Signature - No POC Shared:  Member indicates that they do not want their POC shared with any EW providers.  Mailed copy of CL tool to member, faxed copy to AL facility, uploaded into [employer] and submitted authorization to health plan.  Chart was returned to CC.   Inessa Alford  Case Management Specialist  [facility]  [phone]

## 2018-06-21 NOTE — PROGRESS NOTES
[facility] Care Coordination Contact  CM received a call from TREVOR Solitario at [facility] stating client is requiring more assistance at this time. Is having more problems with incontinence and is not able to ambulate She requested the CL tool be revised to reflect the changes. CM made changes in the tool and Kassi called back today stating the changes will cover the services client is receiving at this time. New tool will be submitted to [facility] with change date of 6/18/18.  Jumana Chambers RN  [facility]   [phone]

## 2018-06-22 NOTE — PROGRESS NOTES
[facility] Care Coordination Contact  Mailed copy of CL tool to member, faxed copy to AL facility, uploaded into [employer] and submitted authorization to health plan.  Inessa Alford  Case Management Specialist  [facility]  [phone]

## 2018-08-03 ENCOUNTER — PATIENT OUTREACH (OUTPATIENT)
Dept: GERIATRIC MEDICINE | Facility: CLINIC | Age: 78
End: 2018-08-03

## 2018-08-03 NOTE — PROGRESS NOTES
[facility] Care Coordination Contact  Notified by hospice staff that member passed away on 08/02/18 at Home.    PCP notified. Called all providers to cancel services.    Death Notification form completed and faxed to [facility].    Chart reviewed, notes printed and this CC's encounter closed. Chart handed off to CMS to process disenrollment tasks.  Jumana Chambers RN  [facility] Care Coordinator  [phone]

## 2018-08-08 VITALS
OXYGEN SATURATION: 87 % | HEART RATE: 102 BPM | DIASTOLIC BLOOD PRESSURE: 52 MMHG | RESPIRATION RATE: 28 BRPM | SYSTOLIC BLOOD PRESSURE: 86 MMHG

## 2018-08-08 NOTE — PROGRESS NOTES
"[facility]    Chief Complaint   Patient presents with     Breathing Problem       HPI:    Brando Tatum is a 78 year old  (1940), who is being seen today for an episodic care visit at [facility].    HPI information obtained from: facility staff and patient report. Today's concern is:     Obstructive chronic bronchitis with exacerbation (H)  Chronic obstructive pulmonary disease, unspecified COPD type (H)  Chronic respiratory failure with hypoxia (H)  Severe protein-calorie malnutrition (H)   Nursing staff noting that patient appears to be transitioning blood pressure lower, weakness, unable to get out of bed.  Not eating or drinking, Respirations increased. When I asked how he was feeling today, he said, \"I'm dying.\"  I asked him if he would like more medicine to help his breathing he agreed.  Also discussed status with Hospice nurse with plans to increase and schedule Morphine Solutabs.  I did update daughter with changes in Kel's status and changes in medication.  She agrees      REVIEW OF SYSTEMS:  Limited secondary to breathlessness and waning cognition but today pt reports short of breath     BP (!) 86/52  Pulse 102  Resp 28  SpO2 (!) 87%  GENERAL APPEARANCE:  Cachectic, oral mucosa dry, mouth breathing  Resp: diminished through out, using accessory muscles. Continuous oxygen per NC @ 3L  CV: rapid but regular rhythm    ASSESSMENT/PLAN:  (J44.9) Chronic obstructive pulmonary disease, unspecified COPD type (H)  (primary encounter diagnosis)  (J44.1) Obstructive chronic bronchitis with exacerbation (H)  (J96.11) Chronic respiratory failure with hypoxia (H)  Comment: end stagen on hospice  Plan: increase to Morphine to 5 mg sL every 4 hours and increase PRN to 5 mg every 2 hours.  Discontinue non- comfort mediations.  Continue Tramadol as long as he can swallow, no change in Methadone,      (E43) Severe protein-calorie malnutrition (H)  Comment: due to high caloric " need due to end stage COPD and unable to eat or drink, lost desire  Plan: offer food and fluids, moist towel, sips of preferred liquids.    Total time with an established patient visit in the assisted livin minutes including discussions about the POC and care coordination with the patient, family, Daughter, caregiver and Hospice nurse. Greater than 50% of total time spent with counseling and coordinating care due to symptom management at end of life, informing daughter of changes    Electronically signed by:  JUVE Silveira CNP

## 2022-02-17 PROBLEM — Z76.89 HEALTH CARE HOME: Status: ACTIVE | Noted: 2018-01-01

## 2024-06-17 PROBLEM — Z76.89 HEALTH CARE HOME: Status: RESOLVED | Noted: 2018-01-01 | Resolved: 2024-06-17
